# Patient Record
Sex: FEMALE | Race: BLACK OR AFRICAN AMERICAN | URBAN - METROPOLITAN AREA
[De-identification: names, ages, dates, MRNs, and addresses within clinical notes are randomized per-mention and may not be internally consistent; named-entity substitution may affect disease eponyms.]

---

## 2023-07-17 ENCOUNTER — APPOINTMENT (RX ONLY)
Dept: URBAN - METROPOLITAN AREA CLINIC 352 | Facility: CLINIC | Age: 28
Setting detail: DERMATOLOGY
End: 2023-07-17

## 2023-07-17 DIAGNOSIS — Z41.9 ENCOUNTER FOR PROCEDURE FOR PURPOSES OTHER THAN REMEDYING HEALTH STATE, UNSPECIFIED: ICD-10-CM

## 2023-07-17 PROCEDURE — ? COSMETIC CONSULTATION: PRODUCTS

## 2023-07-17 PROCEDURE — ? COSMETIC CONSULTATION: CHEMICAL PEELS

## 2023-07-17 PROCEDURE — ? COSMETIC CONSULTATION: HYDRAFACIAL

## 2023-07-17 PROCEDURE — ? COSMETIC QUOTE

## 2023-07-17 NOTE — PROCEDURE: COSMETIC QUOTE
Implant 4 Price/Unit (In Dollars- Use Only Numbers And Decimals): 0.00
Implant 10 Units: 0
Face Procedure 2: Microneedling
Face Procedure 5: Deluxe Hydrafacial
Apply Facility Fee: No
Facility Fee Units (Optional): 1
Body Procedure 1: Chemical Peel Body
Face Procedure 6 Price/Unit (In Dollars- Use Only Numbers And Decimals): 75.00
Face Procedure 3 Price/Unit (In Dollars- Use Only Numbers And Decimals): 429.00
Face Procedure 3 Units: 3
Include Sales Tax On Surgeon's Fees: Yes
Face Procedure 3 Percentage Discount: 10
Face Procedure 4 Price/Unit (In Dollars- Use Only Numbers And Decimals): 175.00
Face Procedure 1 Price/Unit (In Dollars- Use Only Numbers And Decimals): 229.00
Face Procedure 5 Price/Unit (In Dollars- Use Only Numbers And Decimals): 299.00
Face Procedure 2 Price/Unit (In Dollars- Use Only Numbers And Decimals): 379.00
Detail Level: Zone
Face Procedure 6: Teen Hydrafacial
Face Procedure 3: VI Peel Series
Body Procedure 1 Price/Unit (In Dollars- Use Only Numbers And Decimals): 500.00
Face Procedure 1: Express Hydrafacial
Face Procedure 4: Clinical Facial

## 2023-07-19 ENCOUNTER — APPOINTMENT (RX ONLY)
Dept: URBAN - METROPOLITAN AREA CLINIC 87 | Facility: CLINIC | Age: 28
Setting detail: DERMATOLOGY
End: 2023-07-19

## 2023-07-19 DIAGNOSIS — L81.1 CHLOASMA: ICD-10-CM | Status: INADEQUATELY CONTROLLED

## 2023-07-19 PROCEDURE — 99214 OFFICE O/P EST MOD 30 MIN: CPT

## 2023-07-19 PROCEDURE — ? COUNSELING

## 2023-07-19 PROCEDURE — ? PRESCRIPTION

## 2023-07-19 RX ORDER — HYDROQUINONE 4 %
1 CREAM (GRAM) TOPICAL TIW
Qty: 28.35 | Refills: 2 | Status: ERX | COMMUNITY
Start: 2023-07-19

## 2023-07-19 RX ADMIN — Medication 1: at 00:00

## 2023-07-19 ASSESSMENT — LOCATION DETAILED DESCRIPTION DERM
LOCATION DETAILED: LEFT INFERIOR CENTRAL MALAR CHEEK
LOCATION DETAILED: RIGHT INFERIOR CENTRAL MALAR CHEEK

## 2023-07-19 ASSESSMENT — LOCATION SIMPLE DESCRIPTION DERM
LOCATION SIMPLE: LEFT CHEEK
LOCATION SIMPLE: RIGHT CHEEK

## 2023-07-19 ASSESSMENT — LOCATION ZONE DERM: LOCATION ZONE: FACE
